# Patient Record
Sex: MALE | Race: OTHER | HISPANIC OR LATINO | URBAN - METROPOLITAN AREA
[De-identification: names, ages, dates, MRNs, and addresses within clinical notes are randomized per-mention and may not be internally consistent; named-entity substitution may affect disease eponyms.]

---

## 2024-05-14 ENCOUNTER — EMERGENCY (EMERGENCY)
Facility: HOSPITAL | Age: 39
LOS: 1 days | Discharge: HOME | End: 2024-05-14
Attending: EMERGENCY MEDICINE
Payer: SELF-PAY

## 2024-05-14 VITALS
HEART RATE: 81 BPM | HEIGHT: 67 IN | RESPIRATION RATE: 20 BRPM | OXYGEN SATURATION: 98 % | WEIGHT: 189.6 LBS | SYSTOLIC BLOOD PRESSURE: 133 MMHG | DIASTOLIC BLOOD PRESSURE: 78 MMHG | TEMPERATURE: 98 F

## 2024-05-14 PROCEDURE — 99285 EMERGENCY DEPT VISIT HI MDM: CPT

## 2024-05-14 PROCEDURE — 73130 X-RAY EXAM OF HAND: CPT

## 2024-05-14 PROCEDURE — 96375 TX/PRO/DX INJ NEW DRUG ADDON: CPT

## 2024-05-14 PROCEDURE — 99284 EMERGENCY DEPT VISIT MOD MDM: CPT

## 2024-05-14 PROCEDURE — 96374 THER/PROPH/DIAG INJ IV PUSH: CPT

## 2024-05-14 PROCEDURE — 73130 X-RAY EXAM OF HAND: CPT | Mod: 26,RT

## 2024-05-14 PROCEDURE — T1013: CPT

## 2024-05-14 PROCEDURE — 99284 EMERGENCY DEPT VISIT MOD MDM: CPT | Mod: 25

## 2024-05-14 RX ORDER — SODIUM CHLORIDE 9 MG/ML
1000 INJECTION INTRAMUSCULAR; INTRAVENOUS; SUBCUTANEOUS ONCE
Refills: 0 | Status: COMPLETED | OUTPATIENT
Start: 2024-05-14 | End: 2024-05-14

## 2024-05-14 RX ORDER — CEFAZOLIN SODIUM 1 G
2000 VIAL (EA) INJECTION ONCE
Refills: 0 | Status: DISCONTINUED | OUTPATIENT
Start: 2024-05-14 | End: 2024-05-14

## 2024-05-14 RX ORDER — KETOROLAC TROMETHAMINE 30 MG/ML
30 SYRINGE (ML) INJECTION ONCE
Refills: 0 | Status: DISCONTINUED | OUTPATIENT
Start: 2024-05-14 | End: 2024-05-14

## 2024-05-14 RX ORDER — CEFAZOLIN SODIUM 1 G
2000 VIAL (EA) INJECTION ONCE
Refills: 0 | Status: COMPLETED | OUTPATIENT
Start: 2024-05-14 | End: 2024-05-14

## 2024-05-14 RX ORDER — CEPHALEXIN 500 MG
1 CAPSULE ORAL
Qty: 40 | Refills: 0
Start: 2024-05-14 | End: 2024-05-23

## 2024-05-14 RX ADMIN — Medication 2000 MILLIGRAM(S): at 20:47

## 2024-05-14 RX ADMIN — SODIUM CHLORIDE 1000 MILLILITER(S): 9 INJECTION INTRAMUSCULAR; INTRAVENOUS; SUBCUTANEOUS at 20:47

## 2024-05-14 RX ADMIN — Medication 30 MILLIGRAM(S): at 20:47

## 2024-05-14 NOTE — ED PROVIDER NOTE - CLINICAL SUMMARY MEDICAL DECISION MAKING FREE TEXT BOX
38 y/o male with pmhx presents to the ED for Right hand middle finger injury and laceration secondary to work injury- car rim impacting it. Upon exam, well appearing male in no acute distress with Distal right middle finger tenderness, Flexion/extension/abd/add intact all digits. No spine tenderness to palpation, Full ROM ext x 4. Tetanus updated yesterday. 40 y/o male with pmhx presents to the ED for Right hand middle finger injury and laceration secondary to work injury- car rim impacting it. Upon exam, well appearing male in no acute distress with Distal right middle finger tenderness, Flexion/extension/abd/add intact all digits. No spine tenderness to palpation, Full ROM ext x 4. Tetanus updated yesterday at .   -XR wet read revealed questionable distal tuft fracture to right middle finger  -Hand consulted recommending abx and follow up   Case discussed with Attending. Hand follow up recommended. IV abx given in the ED, PO abx sent at home.  at bedside to explain instructions 38 y/o male with pmhx presents to the ED for Right hand middle finger injury and laceration secondary to work injury- car rim impacting it. Upon exam, well appearing male in no acute distress with Distal right middle finger tenderness, Flexion/extension/abd/add intact all digits. No spine tenderness to palpation, Full ROM ext x 4. Tetanus updated yesterday at .   -XR wet read revealed questionable distal tuft fracture to right middle finger  -Area cleaned and stressing applied  -Hand consulted recommending abx and follow up   Case discussed with Attending. Hand follow up recommended. IV abx given in the ED, PO abx sent at home.  at bedside to explain instructions 38 y/o male with pmhx presents to the ED for Right hand middle finger injury and laceration secondary to work injury- car rim impacting it. Upon exam, well appearing male in no acute distress with Distal right middle finger tenderness, Flexion/extension/abd/add intact all digits. No spine tenderness to palpation, Full ROM ext x 4. Tetanus updated yesterday at .   -XR wet read revealed questionable distal tuft fracture to right middle finger  -Area cleaned and stressing applied  -Hand consulted recommending abx and follow up   Case discussed with Attending. Hand follow up recommended. IV abx given in the ED, PO abx sent at home.  at bedside to explain instructions    , attendin-year-old male presents with right third digit injury and laceration while at work.  Patient was seen in urgent care yesterday had tetanus shot x-ray that was told to have questionable distal tuft fracture.  On exam patient has laceration through the nail beds no obvious signs infection.  X-ray performed show no obvious fracture on my read.  Patient given Ancef due to possible open fracture.  Patient seen by hand consult recommend antibiotic and outpatient follow-up.

## 2024-05-14 NOTE — ED PROVIDER NOTE - CARE PROVIDER_API CALL
Cody Marks  Orthopaedic Surgery  [address] [place] [address]  Phone: [phone]  Fax: [phone]  Follow Up Time:

## 2024-05-14 NOTE — CONSULT NOTE ADULT - SUBJECTIVE AND OBJECTIVE BOX
ORTHO-HAND SERVICE    Pt Name: SHARMILA LEBLANC    MRN: [id number]    ED in person  utilized for this encounter    Patient is a 39y Male presenting to the emergency department with a chief complaint of right 3rd digit pain. Patient states that he was at work yesterday and a car Rim fell and landed on his right middle finger. Patient went to  and was told to come to the ER. Patient complains to pain to affected digit and mild numbness at the distal tip otherwise normal sensation to remaining parts of finger. Denies acute motor / sensory loss, denies fever/ chills. No other orthopedic complaints.       REVIEW OF SYSTEMS    General: Alert, responsive, in NAD    Skin/Breast: laceration through nail plate of right 3rd digit     Respiratory and Thorax: No difficulty breathing. No cough.  	   Cardiovascular: No chest pain. No palpitations.    Gastrointestinal: No abdominal pain. No diarrhea.     Musculoskeletal: SEE HPI.    Neurological: No sensory or motor changes.     Psychiatric: No anxiety or depression.    ROS is otherwise negative.      PAST MEDICAL & SURGICAL HISTORY:  PAST MEDICAL & SURGICAL HISTORY:  No pertinent past medical history      No significant past surgical history          Allergies: No Known Allergies      Medications:     FAMILY HISTORY:  : non-contributory    Social History:   Social History:      Daily Height in cm: 170.18 (14 May 2024 18:35)    Daily                     PHYSICAL EXAM:      Appearance: Alert, responsive, in no acute distress.    Neurological: Sensation is grossly intact to light touch. mild paresthesias to distal tip of right 3rd digit, normal sensation to remaining aspect of finger, radial and ulnar digital nerves intact.    Skin: Skin/Breast: laceration through nail plate of right 3rd digit, laceration is scabbed over, proximal nail bed and germinal matrix intact    Vascular: 2+ distal pulses. Cap refill < 2 sec.     Musculoskeletal:         Right Upper Extremity: Wrist NTTP, full ROM, flexion/extension intact, Right hand NTTP,full ROM of digits 1,2,4,5 full flexion and extension, 3rd digit normal ROM of MCP & PIP joint, mildly decreased ROM at DIP 2/2 to stiffness/pain, able to make near full fist to approximately 90%      Imaging Studies: PRELIMINARY PA READ  Xray right hand reveals distal tuft fracture to right 3rd digit - pending official radiology read     A/P:  Pt is a  39y Male with distal tuft fracture to right 3rd digit     PLAN:   *Pain control   * Ancef IV 2 G x1 dose given in ER  * Recommend updated tetanus shot   * Daily wound care at home with saline/betadine soaks daily   * Oral abx upon discharge   * Soft dressing to right 3rd digit   * WBAT, ROM AT   * No acute orthopedic intervention at this time   * F/U outpatient with Dr. Martinez Ortho attending   * Case, images, plan discussed with Dr. Martinez

## 2024-05-14 NOTE — ED PROVIDER NOTE - NSFOLLOWUPINSTRUCTIONS_ED_ALL_ED_FT
Laceration    A laceration is a cut that goes through all of the layers of the skin and into the tissue that is right under the skin. Some lacerations heal on their own. Others need to be closed with skin adhesive strips, skin glue, stitches (sutures), or staples. Proper laceration care minimizes the risk of infection and helps the laceration to heal better.  If non-absorbable stitches or staples have been placed, they must be taken out within the time frame instructed by your healthcare provider.    SEEK IMMEDIATE MEDICAL CARE IF YOU HAVE ANY OF THE FOLLOWING SYMPTOMS: swelling around the wound, worsening pain, drainage from the wound, red streaking going away from your wound, inability to move finger or toe near the laceration, or discoloration of skin near the laceration.    Fracture    A fracture is a break in one of your bones. This can occur from a variety of injuries, especially traumatic ones. Symptoms include pain, bruising, or swelling. Do not use the injured limb. If a fracture is in one of the bones below your waist, do not put weight on that limb unless instructed to do so by your healthcare provider. Crutches or a cane may have been provided. A splint or cast may have been applied by your health care provider. Make sure to keep it dry and follow up with an orthopedist as instructed.    SEEK IMMEDIATE MEDICAL CARE IF YOU HAVE ANY OF THE FOLLOWING SYMPTOMS: numbness, tingling, increasing pain, or weakness in any part of the injured limb.    Please follow up with hand specialist within 3 days   Please take antibiotic as directed     Laceración      Moise laceración es un derick que atraviesa todas las capas de la piel hasta el tejido que se encuentra hallie debajo de la piel. Algunas laceraciones sanan por sí solas. Otros deben cerrarse con tiras adhesivas para la piel, pegamento para la piel, puntos (suturas) o grapas. El cuidado adecuado de la laceración minimiza el riesgo de infección y ayuda a que la laceración sane mejor. Si se hutson colocado puntos o grapas no absorbibles, se deben retirar dentro del plazo indicado por watkins proveedor de atención médica.      BUSQUE ATENCIÓN MÉDICA INMEDIATA SI TIENE ALGUNO DE LOS SIGUIENTES SÍNTOMAS: hinchazón alrededor de la herida, empeoramiento del dolor, drenaje de la herida, azul cassidy que se alejan de la herida, incapacidad para  el dedo de la mano o del pie cerca de la laceración o decoloración de la piel cerca de la herida. laceración.  Fractura  Moise fractura es moise rotura en chelsey de faraz huesos. [place] puede ocurrir por moise variedad de lesiones, especialmente las traumáticas. Los síntomas incluyen dolor, hematomas o hinchazón. No utilice la extremidad lesionada. Si hay moise fractura en chelsey de los huesos debajo de la cintura, no coloque peso sobre tito extremidad a menos que watkins proveedor de atención médica se lo indique. Es posible que se hayan proporcionado muletas o un bastón. Es posible que watkins proveedor de atención médica le haya colocado moise férula o un yeso. Asegúrese de mantenerlo seco y consulte con un ortopedista según las instrucciones.      BUSQUE ATENCIÓN MÉDICA INMEDIATA SI TIENE ALGUNO DE LOS SIGUIENTES SÍNTOMAS: entumecimiento, hormigueo, aumento del dolor o debilidad en cualquier parte de la extremidad lesionada.  Por favor leonora un seguimiento con un especialista en chiqui dentro de los 3 días.     [place]wyn el antibiótico según las indicaciones.

## 2024-05-14 NOTE — ED ADULT TRIAGE NOTE - CHIEF COMPLAINT QUOTE
Pt arrives to Ed s/p R hand middle finger injury  at work - sent from  for nondisplaced Fx of tuft of distal phalanx

## 2024-05-14 NOTE — CONSULT NOTE ADULT - NS ATTEND AMEND GEN_ALL_CORE FT
Agree with PA note and plan as written - splint and dressing applied - continue closed treatment of right hand tuft fracture and follow up in 1 week - tetanus to be given and ortho stable for discharge.

## 2024-05-14 NOTE — ED PROVIDER NOTE - PATIENT PORTAL LINK FT
You can access the FollowMyHealth Patient Portal offered by [facility] by registering at the following website: [email or web address]. By joining [facility]’s FollowMyHealth portal, you will also be able to view your health information using other applications (apps) compatible with our system.

## 2024-05-14 NOTE — ED PROVIDER NOTE - PHYSICAL EXAMINATION
Gen: No acute distress, non toxic male, speaking in full sentences   HEENT: NC/AT, Mucous membranes moist   Eyes: pink conjunctivae, EOMI, PERRL  CV: RRR, nl s1/s2 noted  Resp: CTAB, normal rate and effort  GI: Abdomen soft, NT, ND. No rebound, no guarding  Neuro: A&O x 3, sensorimotor intact without deficits   MSK: (+) Distal right middle finger tenderness, Flexion/extension/abd/add intact all digits. No spine tenderness to palpation, Full ROM ext x 4. Radial pulse intact. Opposition/thumbs-up/A-okay sign intact   Skin: (+) ~1 cm laceration through the distal right middle finger with nail involvement   Ambulatory in the ED Gen: No acute distress, non toxic male, speaking in full sentences   HEENT: NC/AT, Mucous membranes moist   Eyes: pink conjunctivae, EOMI, PERRL  CV: RRR, nl s1/s2 noted  Resp: CTAB, normal rate and effort  GI: Abdomen soft, NT, ND. No rebound, no guarding  Neuro: (+) Mild Numbness sensation to distal tip right middle finger, A&O x 3  MSK: (+) Distal right middle finger tenderness, Flexion/extension/abd/add intact all digits. No spine tenderness to palpation, Full ROM ext x 4. Radial pulse intact. Opposition/thumbs-up/A-okay sign intact   Skin: (+) ~1 cm laceration through the distal right middle finger with nail involvement   Ambulatory in the ED

## 2024-05-14 NOTE — ED PROVIDER NOTE - OBJECTIVE STATEMENT
40 y/o male with pmhx presents to the ED for Right hand middle finger secondary to     He initially went to , was told he had a non-displaced tuft fracture of the distal phalanx. 38 y/o male with pmhx presents to the ED for Right hand middle finger injury and laceration secondary to work injury- car rim impacting it. He initially went to  yesterday, was told he had a non-displaced tuft fracture of the distal phalanx. No fever/chills, no numbness/tingling in the hand. Tetanus shot at . No pain meds taken at home. 40 y/o male with pmhx presents to the ED for Right hand middle finger injury and laceration secondary to work injury- car rim impacting it. He initially went to  yesterday, was told he had a non-displaced tuft fracture of the distal phalanx. No fever/chills, no numbness/tingling in the hand. Tetanus shot at . No pain meds taken at home.  Edu at bedside. 38 y/o male with pmhx presents to the ED for Right hand middle finger injury and laceration secondary to work injury yesterday at 4 pm- car rim impacting it. He initially went to  yesterday, was told he had a non-displaced tuft fracture of the distal phalanx. No fever/chills, no numbness/tingling in the hand. Tetanus shot at . No pain meds taken at home.  Edu at bedside. Right hand dominant.

## 2024-05-14 NOTE — ED ADULT NURSE NOTE - OBJECTIVE STATEMENT
pt presents to ED in NAD c/o R middle finger pain and laceration. pt states he injured his finger hitting a car rim at work. pt denies chest pain, N/V, SOB, fever, chills, lightheadedness, and dizziness. pt breathing even and unlabored at this time.

## 2024-05-18 DIAGNOSIS — M79.644 PAIN IN RIGHT FINGER(S): ICD-10-CM

## 2024-05-18 DIAGNOSIS — S62.662B NONDISPLACED FRACTURE OF DISTAL PHALANX OF RIGHT MIDDLE FINGER, INITIAL ENCOUNTER FOR OPEN FRACTURE: ICD-10-CM
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
